# Patient Record
Sex: FEMALE | Race: OTHER | NOT HISPANIC OR LATINO
[De-identification: names, ages, dates, MRNs, and addresses within clinical notes are randomized per-mention and may not be internally consistent; named-entity substitution may affect disease eponyms.]

---

## 2021-10-18 PROBLEM — Z00.00 ENCOUNTER FOR PREVENTIVE HEALTH EXAMINATION: Status: ACTIVE | Noted: 2021-10-18

## 2021-11-01 ENCOUNTER — RESULT REVIEW (OUTPATIENT)
Age: 71
End: 2021-11-01

## 2021-11-01 ENCOUNTER — OUTPATIENT (OUTPATIENT)
Dept: OUTPATIENT SERVICES | Facility: HOSPITAL | Age: 71
LOS: 1 days | End: 2021-11-01
Payer: MEDICARE

## 2021-11-01 ENCOUNTER — APPOINTMENT (OUTPATIENT)
Dept: ORTHOPEDIC SURGERY | Facility: CLINIC | Age: 71
End: 2021-11-01
Payer: MEDICARE

## 2021-11-01 DIAGNOSIS — M17.11 UNILATERAL PRIMARY OSTEOARTHRITIS, RIGHT KNEE: ICD-10-CM

## 2021-11-01 DIAGNOSIS — Z87.39 PERSONAL HISTORY OF OTHER DISEASES OF THE MUSCULOSKELETAL SYSTEM AND CONNECTIVE TISSUE: ICD-10-CM

## 2021-11-01 DIAGNOSIS — Z96.641 PRESENCE OF RIGHT ARTIFICIAL HIP JOINT: ICD-10-CM

## 2021-11-01 DIAGNOSIS — Z96.642 PRESENCE OF LEFT ARTIFICIAL HIP JOINT: ICD-10-CM

## 2021-11-01 DIAGNOSIS — Z78.9 OTHER SPECIFIED HEALTH STATUS: ICD-10-CM

## 2021-11-01 DIAGNOSIS — M54.16 RADICULOPATHY, LUMBAR REGION: ICD-10-CM

## 2021-11-01 DIAGNOSIS — Z96.652 PRESENCE OF LEFT ARTIFICIAL KNEE JOINT: ICD-10-CM

## 2021-11-01 DIAGNOSIS — Z60.2 PROBLEMS RELATED TO LIVING ALONE: ICD-10-CM

## 2021-11-01 DIAGNOSIS — M62.838 OTHER MUSCLE SPASM: ICD-10-CM

## 2021-11-01 PROCEDURE — 72170 X-RAY EXAM OF PELVIS: CPT

## 2021-11-01 PROCEDURE — 72020 X-RAY EXAM OF SPINE 1 VIEW: CPT | Mod: 26

## 2021-11-01 PROCEDURE — 73564 X-RAY EXAM KNEE 4 OR MORE: CPT | Mod: 26,LT,RT

## 2021-11-01 PROCEDURE — 72020 X-RAY EXAM OF SPINE 1 VIEW: CPT

## 2021-11-01 PROCEDURE — 99204 OFFICE O/P NEW MOD 45 MIN: CPT

## 2021-11-01 PROCEDURE — 73564 X-RAY EXAM KNEE 4 OR MORE: CPT

## 2021-11-01 PROCEDURE — 72170 X-RAY EXAM OF PELVIS: CPT | Mod: 26

## 2021-11-01 RX ORDER — METHYLPREDNISOLONE 4 MG/1
4 TABLET ORAL
Qty: 1 | Refills: 0 | Status: ACTIVE | COMMUNITY
Start: 2021-11-01 | End: 1900-01-01

## 2021-11-01 SDOH — SOCIAL STABILITY - SOCIAL INSECURITY: PROBLEMS RELATED TO LIVING ALONE: Z60.2

## 2021-11-01 NOTE — HISTORY OF PRESENT ILLNESS
[de-identified] : 70 y/o female presents for evaluation of bilateral leg pain s/p left TKR, and bilateral hip replacements. Patient reports that she has been having leg spasms intermittently while turning in bed at night over the past year and a half with a frequency of about once per month. She repots that when she goes to roll over in bed she occasionally will get a cramp in her hamstring and calf and she is unable to straighten her leg.  She denies any symptoms in between episodes. Her joint replacements were uncomplicated and she is very active and able to walk several blocks without hip or knee pain.  She denies any radicular symptoms. She has no history of blood clots and is not on any anticoagulants other than baby aspirin for cardiac prophylaxis.

## 2021-11-01 NOTE — DISCUSSION/SUMMARY
[de-identified] : 72 y/o active female s/p uncomplicated left total knee and bilateral total hips with grade 2 lumbar spondylolisthesis. I believe this is causing her intermittent leg pain and spasm while turning over. She has no radicular symptoms and her neurovascular exam is otherwise unremarkable. She has done excellent after her joint replacements and denies hip or knee pain. Xrays today show no evidence of periprosthetic complication. She does have tricompartmental OA in her right knee, but she is asymptomatic.\par \par Plan: \par -Medrol dose pack. Stop diclofenac while on Medrol. \par - PT for osteopathic eval and treatment of lumbar spine\par - F/U with Dr. Heard for spine evaluation

## 2021-11-01 NOTE — PHYSICAL EXAM
[de-identified] : Patient is well nourished, well-developed, in no acute distress, with appropriate mood and affect. The patient is oriented to time, place, and person. Gait evaluation does not reveal a limp. The skin examination does not reveal obvious lesions, lacerations, or ecchymosis.\par \par Left Knee: No effusion. Well healed anterior surgical scar. AROM 0-120. No joint tenderness. Quads 4/5. Negative Homans. No  calf tenderness. \par \par Right Knee: AROM 0-125. No effusion.  No joint tenderness. Negative McMurrays and Steinmann. Negative Homans. No  calf tenderness. \par \par Negative SLR. \par Positive SI joint tenderness with step off \par \par Motor Strength:\par Lower Extremity\par Bilateral  IP/Q/H/TA/GS/EHL 5/5\par Sensation:  \par LE: SILT bilateral from L2-S1\par Pulses:\par LE:  DP/PT 2+ b/l, CR <2 sec bilaterally\par LE Compartments: Bilateral soft, non-tender, no calf swelling appreciated\par \par  [de-identified] : Standard 4-view radiographs of the bilateral knees obtained today were reviewed today and show status post left total knee replacement. Components in good alignment without evidence of subsidence or osseous complication. Right knee shows mild-moderate tricompartmental arthritis worst in the lateral and PF compartments. \par \par AP pelvis radiographs were obtained today and show s/p bilateral hip replacements with components in anatomic alignment without fracture or dislocation. \par \par Lateral lumbar spine radiographs were obtained today and show grade 2 spondylolisthesis of L5 vertebra\par \par \par

## 2021-12-08 ENCOUNTER — RESULT REVIEW (OUTPATIENT)
Age: 71
End: 2021-12-08

## 2021-12-08 ENCOUNTER — OUTPATIENT (OUTPATIENT)
Dept: OUTPATIENT SERVICES | Facility: HOSPITAL | Age: 71
LOS: 1 days | End: 2021-12-08
Payer: MEDICARE

## 2021-12-08 ENCOUNTER — APPOINTMENT (OUTPATIENT)
Dept: ORTHOPEDIC SURGERY | Facility: CLINIC | Age: 71
End: 2021-12-08
Payer: MEDICARE

## 2021-12-08 VITALS
SYSTOLIC BLOOD PRESSURE: 120 MMHG | OXYGEN SATURATION: 99 % | HEIGHT: 62 IN | DIASTOLIC BLOOD PRESSURE: 80 MMHG | HEART RATE: 76 BPM | WEIGHT: 185 LBS | BODY MASS INDEX: 34.04 KG/M2

## 2021-12-08 PROCEDURE — 72082 X-RAY EXAM ENTIRE SPI 2/3 VW: CPT

## 2021-12-08 PROCEDURE — 99214 OFFICE O/P EST MOD 30 MIN: CPT

## 2021-12-08 PROCEDURE — 72084 X-RAY EXAM ENTIRE SPI 6/> VW: CPT | Mod: 26

## 2021-12-08 PROCEDURE — 72100 X-RAY EXAM L-S SPINE 2/3 VWS: CPT

## 2021-12-08 RX ORDER — ETODOLAC 400 MG/1
400 TABLET, FILM COATED ORAL
Qty: 60 | Refills: 0 | Status: ACTIVE | COMMUNITY
Start: 2021-12-08 | End: 1900-01-01

## 2021-12-09 NOTE — DISCUSSION/SUMMARY
[de-identified] : Discussed my findings with the patient. Ms. Streeter reports her primary symptom at this time is her low back pain. I am recommending she stop physical therapy at this time as it appears to be aggravating her low back pain. Also given instructions and prescription for a course of etodolac. \par The patient will follow up in 3-4 weeks if still having pain, sooner if there is an issue. Would order MRI lumbar without contrast before follow up appointment at that time. All questions answered.

## 2021-12-09 NOTE — HISTORY OF PRESENT ILLNESS
[de-identified] : Referred by Dr. Chino\par \par Ms. CONNELL is a very pleasant 71 year old female who complains of lower extremity pain for the past several years, atraumatic. Pain localized to bilateral medial thighs, does not extend below the knees. She reports only gets pain at night, wakes from sleep. No daytime pain, able to walk several miles without limitation. Denies lower extremity numbness/weakness/paresthesias. Started physical therapy per Dr. Chino last month. Reports low back pain onset after massage during therapy, describes as a deep ache. Denies low back pain prior to starting PT. Lower extremity pain improving, low back pain now primary complaint. Improves with intermittent NSAIDs.\par \par The patient no history of previous spine surgery.\par \par The patient has no history of unexpected weight loss, no history of active cancer, no history bladder or bowel dysfunction, no night pain, no fevers or chills.\par \par The past medical history, surgical history, family history, allergies, medications, 10+ point review of systems, family history and social history were reviewed and non contributory.\par \par

## 2022-01-05 ENCOUNTER — APPOINTMENT (OUTPATIENT)
Dept: ORTHOPEDIC SURGERY | Facility: CLINIC | Age: 72
End: 2022-01-05
Payer: MEDICARE

## 2022-01-05 DIAGNOSIS — M47.816 SPONDYLOSIS W/OUT MYELOPATHY OR RADICULOPATHY, LUMBAR REGION: ICD-10-CM

## 2022-01-05 PROCEDURE — 99214 OFFICE O/P EST MOD 30 MIN: CPT

## 2022-01-05 NOTE — HISTORY OF PRESENT ILLNESS
[de-identified] : Follow up 1/5/22: Patient presents for follow up. Reports that bilateral medial thigh pain now almost completely resolved. Primary symptom is no lumbosacral pain. Denies new neurologic symptoms.\par \par Initial 12/8/21: Ms. CONNELL is a very pleasant 71 year old female who complains of lower extremity pain for the past several years, atraumatic. Pain localized to bilateral medial thighs, does not extend below the knees. She reports only gets pain at night, wakes from sleep. No daytime pain, able to walk several miles without limitation. Denies lower extremity numbness/weakness/paresthesias. Started physical therapy per Dr. Chino last month. Reports low back pain onset after massage during therapy, describes as a deep ache. Denies low back pain prior to starting PT. Lower extremity pain improving, low back pain now primary complaint. Improves with intermittent NSAIDs.\par \par The patient no history of previous spine surgery.\par \par The patient has no history of unexpected weight loss, no history of active cancer, no history bladder or bowel dysfunction, no night pain, no fevers or chills.\par \par The past medical history, surgical history, family history, allergies, medications, 10+ point review of systems, family history and social history were reviewed and non contributory.\par \par

## 2022-01-05 NOTE — DISCUSSION/SUMMARY
[de-identified] : Discussed my findings with the patient. I am recommending an MRI lumbar without contrast, order given. Patient had stopped PT after last visit per my instruction, ok to resume. Order for PT given. She will follow up with me in approximately 6 weeks after imaging has been completed, sooner if there is an issue. All questions answered.

## 2022-01-05 NOTE — PHYSICAL EXAM
[de-identified] : General: patient is well developed, well nourished, in no acute \par distress, alert and oriented x 3. \par \par Mood and affect: normal\par \par Respiratory: no respiratory distress noted\par \par Skin: no scars over spine, skin intact, no erythema, increased warmth\par \par Alignment:The spine is well compensated in the coronal and sagittal plane.  \par \par Gait: The patient is able to toe walk and heel walk without difficulty. \par \par Palpation: no tenderness to palpation spine or paraspinal region\par \par Range of motion: Lumbar spine ROM is restricted\par \par Neurologic Exam:\par Motor: Manual Muscle testing in the lower extremities is 5 out of 5 in all muscle groups. There is no evidence of muscular atrophy in the lower extremities. Sensory: Sensation to light touch is grossly intact in the lower extremities\par \par Reflexes: DTR are present and symmetric throughout, negative dukes bilaterally, negative inverted radial reflex bilaterally, no clonus, plantar responses are flexor\par \par Hip Exam: No pain with internal or external rotation of hips bilaterally\par \par Special tests: Straight leg raise test negative.  Cross straight leg test negative.  RAFIA test negative\par \par Vascular: Examination of the peripheral vascular system demonstrates no evidence of congestion or edema. no evidence of lymphedema bilateral lower extremities, pulses are present and symmetric in both lower extremities.\par \par  [de-identified] : XR 12/8/21: multilevel degenerative changes, grade 1 anterolisthesis L4 on L5, no dynamic instability, mild lumbar levocurvature, no fractures seen

## 2022-02-09 ENCOUNTER — APPOINTMENT (OUTPATIENT)
Dept: ORTHOPEDIC SURGERY | Facility: CLINIC | Age: 72
End: 2022-02-09
Payer: MEDICARE

## 2022-02-09 PROCEDURE — 99214 OFFICE O/P EST MOD 30 MIN: CPT

## 2022-02-11 NOTE — ADDENDUM
[FreeTextEntry1] : Documented by Mariah Murry acting as scribe for Dr. Heard on 02/09/2022 \par \par All Medical record entries made by the Scribe were at my, Dr. Heard's, direction and personally dictated by me on 02/09/2022. I have reviewed the chart and agree that the record accurately reflects my personal performance of the history, physical exam, assessment and plan. I have also personally directed, reviewed, and agreed with the discharge instructions.

## 2022-02-11 NOTE — DISCUSSION/SUMMARY
[de-identified] : Diagnosis: L4-5 grade 1 spondylolisthesis with severe stenosis and L4-S1 facet arthropathy\par \par Najma is actually doing quite well with her symptoms overall despite her imaging findings. Outside of the first 1.5 hours of the day where she has significant buttock discomfort she is quite functional and does not have pain throughout the rest of the day. She does not have any symptoms of lumbar radiculopathy or neurogenic claudication throughout the day. Her exam is normal. I've advised her to keep an eye on this problem at this point and watch out for more concerning nerve symptoms or increasing pain in her back or buttocks region. If that occurs she will come for FU. We discussed that in her lifetime it is likely she will need an operation to address this although at this point she is relatively asymptomatic so I would not address it at this point. All questions answered.

## 2022-02-11 NOTE — HISTORY OF PRESENT ILLNESS
[de-identified] : Follow up 2/9/22: Patient presents for a follow up. Patient continues to have low back pain radiating to both buttocks every morning. Denies any pain radiating further down lower extremities. She only gets pain in the morning which resolves after 1.5 hours. Able to walk without restrictions. Able to do daily activities without restrictions. Patient has been taking Diclofenac. Denies new neurologic symptoms. Here to review imaging. 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 She also had knee replacement in the past.\par \par Follow up 1/5/22: Patient presents for follow up. Reports that bilateral medial thigh pain now almost completely resolved. Primary symptom is no lumbosacral pain. Denies new neurologic symptoms.\par \par Initial 12/8/21: Ms. CONNELL is a very pleasant 71 year old female who complains of lower extremity pain for the past several years, atraumatic. Pain localized to bilateral medial thighs, does not extend below the knees. She reports only gets pain at night, wakes from sleep. No daytime pain, able to walk several miles without limitation. Denies lower extremity numbness/weakness/paresthesias. Started physical therapy per Dr. Chino last month. Reports low back pain onset after massage during therapy, describes as a deep ache. Denies low back pain prior to starting PT. Lower extremity pain improving, low back pain now primary complaint. Improves with intermittent NSAIDs.\par \par The patient no history of previous spine surgery.\par \par The patient has no history of unexpected weight loss, no history of active cancer, no history bladder or bowel dysfunction, no night pain, no fevers or chills.\par \par The past medical history, surgical history, family history, allergies, medications, 10+ point review of systems, family history and social history were reviewed and non contributory.\par \par

## 2022-02-11 NOTE — PHYSICAL EXAM
[de-identified] : Physical Exam:\par \par General: patient is well developed, well nourished, in no acute distress, alert and oriented x3.\par \par Mood and affect: normal\par \par Respiratory: no respiratory distress noted\par \par Skin: no scars over spine, skin intact, no erythema, increased warmth\par \par Alignment: The spine is well compensated in the coronal and sagittal plane\par \par Gait: The patient is able to toe walk and heel walk without difficulty. The patient is able to tandem gait without difficulty.\par \par Palpation: no tenderness to palpation spine or paraspinal region\par \par Range of motion: lumbar spine ROM is full\par \par Neurological Exam:\par Motor: Manual muscle testing in the upper and lower extremities is 5 out of 5 in all muscle groups. There is no evidence of muscular atrophy in the upper extremities\par Sensory: Sensation to light touch is grossly intact in the upper and lower extremities\par \par Reflexes: DTR are present and symmetric throughout, no clonus, plantar responses are flexor\par \par Hip exam: No pain with internal or external rotation of hips bilaterally\par \par Special tests: Straight leg raise test negative. Cross straight leg test negative. RAFIA test negative.\par \par Vascular: Examination of the peripheral vascular system demonstrates no evidence of congestion or edema. no lymphedema bilateral lower extremities, pulses are present and symmetric in both lower extremities.\par  [de-identified] : 1/18/22 MRI lumbar spine: grade 1 spondylolisthesis at L4-5 with disc uncovering, severe central lateral recess stenosis at this level, severe facet arthropathy, severe facet atrophy at L5-S1 though there is no stenosis at that level, L4-5 moderate to severe nerve foraminal stenosis bilaterally\par \par XR 12/8/21: multilevel degenerative changes, grade 1 anterolisthesis L4 on L5, no dynamic instability, mild lumbar levocurvature, no fractures seen

## 2022-03-03 ENCOUNTER — APPOINTMENT (OUTPATIENT)
Dept: ORTHOPEDIC SURGERY | Facility: CLINIC | Age: 72
End: 2022-03-03
Payer: MEDICARE

## 2022-03-03 VITALS — BODY MASS INDEX: 34.41 KG/M2 | WEIGHT: 187 LBS | RESPIRATION RATE: 16 BRPM | HEIGHT: 62 IN

## 2022-03-03 PROCEDURE — 99213 OFFICE O/P EST LOW 20 MIN: CPT

## 2022-03-03 PROCEDURE — 73110 X-RAY EXAM OF WRIST: CPT | Mod: LT,RT

## 2022-06-30 ENCOUNTER — FORM ENCOUNTER (OUTPATIENT)
Age: 72
End: 2022-06-30

## 2022-07-01 ENCOUNTER — NON-APPOINTMENT (OUTPATIENT)
Age: 72
End: 2022-07-01

## 2022-07-01 ENCOUNTER — APPOINTMENT (OUTPATIENT)
Dept: NEUROLOGY | Facility: CLINIC | Age: 72
End: 2022-07-01

## 2022-07-01 VITALS
OXYGEN SATURATION: 96 % | HEIGHT: 62 IN | DIASTOLIC BLOOD PRESSURE: 62 MMHG | BODY MASS INDEX: 27.05 KG/M2 | WEIGHT: 147 LBS | TEMPERATURE: 97.9 F | HEART RATE: 89 BPM | SYSTOLIC BLOOD PRESSURE: 135 MMHG

## 2022-07-01 DIAGNOSIS — G56.03 CARPAL TUNNEL SYNDROM,BILATERAL UPPER LIMBS: ICD-10-CM

## 2022-07-01 PROCEDURE — 95885 MUSC TST DONE W/NERV TST LIM: CPT

## 2022-07-01 PROCEDURE — 95911 NRV CNDJ TEST 9-10 STUDIES: CPT

## 2022-07-01 PROCEDURE — 99204 OFFICE O/P NEW MOD 45 MIN: CPT | Mod: 25

## 2022-07-01 NOTE — CONSULT LETTER
[Dear  ___] : Dear  [unfilled], [Consult Letter:] : I had the pleasure of evaluating your patient, [unfilled]. [Please see my note below.] : Please see my note below. [Consult Closing:] : Thank you very much for allowing me to participate in the care of this patient.  If you have any questions, please do not hesitate to contact me. [Sincerely,] : Sincerely, [FreeTextEntry3] : Andrey Baires M.D.\par Neurology, Electromyography and Neuromuscular Medicine\par [facility]\par \par  of Neurology\par \[facility] [place] [facility]\"" / [facility] [employer]

## 2022-07-01 NOTE — PHYSICAL EXAM
[FreeTextEntry1] : Motor: UE strength 5/5 symmetric\par Sensory: tinel's sign positive at right wrist, absent on left \par Reflexes: 2+ symmetric UE b/l\par Gait: normal

## 2022-07-01 NOTE — PROCEDURE
[FreeTextEntry1] : \par Nerve Conduction and Electromyography Report\par  [FreeTextEntry3] : \par Electro Physiologic Findings:\par \par Limb temperature was monitored and maintained at approximately 32 – 36° C in the upper extremities.\par \par The right median sensory response was low amplitude and there was complete conduction block across the wrist; the mixed nerve response was very slow across the wrist. The right median distal motor latency was prolonged without conduction block, the motor amplitude was borderline, and there was a Juancarlos-Coni anastomosis. The left median sensory and mixed nerve responses were slow across the wrist; the distal motor latency was borderline without conduction block and the motor amplitude was normal. The lumbrical studies were positive bilaterally, worse on the right. The right radial sensory response was normal. \par \par Needle electromyography was performed on the right abductor pollicis brevis muscle which demonstrated mild chronic denervation without active denervation. \par \par Clinical Electrophysiological Impression: \par \par This electrodiagnostic study demonstrated bilateral median nerve entrapments at the wrists, moderate on the right and mild on the left. There was no evidence of upper extremity polyneuropathy.

## 2022-07-01 NOTE — HISTORY OF PRESENT ILLNESS
[FreeTextEntry1] : Referred by Dr. Rod for hand numbness - started about 3-4 months ago, worse at night, both hands about equal\par She's been wearing wrist braces which help\par She also has severe low back and buttock pain, worst in the morning, no radiation in to the legs \par \par Reviewed:\par notes from ortho hand, ortho spine\par MRI L spine - multilevel spondylosis worst at L4/5 with severe central canal stenosis

## 2022-07-01 NOTE — CONSULT LETTER
[Dear  ___] : Dear  [unfilled], [Consult Letter:] : I had the pleasure of evaluating your patient, [unfilled]. [Please see my note below.] : Please see my note below. [Consult Closing:] : Thank you very much for allowing me to participate in the care of this patient.  If you have any questions, please do not hesitate to contact me. [Sincerely,] : Sincerely, [FreeTextEntry3] : Andrey Baires M.D.\par Neurology, Electromyography and Neuromuscular Medicine\par [facility]\par \par  of Neurology\par [facility] / [facility] [employer]

## 2022-07-01 NOTE — ASSESSMENT
[FreeTextEntry1] : Symptoms and exam consistent with bilateral carpal tunnel syndrome\par NCS/EMG demonstrated bilateral median nerve entrapments at the wrists - moderate on the right and mild on the left \par Symptoms are well controlled with nocturnal wrist splinting, if symptoms worsen then f/u with Dr. Rod for surgical decompression \par \par Back pain / spinal stenosis - she will f/u with Dr. Heard if symptoms worsen \par \par See separate procedure note for full results of NCS/EMG study

## 2022-08-10 ENCOUNTER — APPOINTMENT (OUTPATIENT)
Dept: ORTHOPEDIC SURGERY | Facility: CLINIC | Age: 72
End: 2022-08-10

## 2022-08-10 DIAGNOSIS — G89.29 LOW BACK PAIN, UNSPECIFIED: ICD-10-CM

## 2022-08-10 DIAGNOSIS — M54.50 LOW BACK PAIN, UNSPECIFIED: ICD-10-CM

## 2022-08-10 PROCEDURE — 99213 OFFICE O/P EST LOW 20 MIN: CPT

## 2022-08-15 PROBLEM — M54.50 LUMBOSACRAL PAIN, CHRONIC: Status: ACTIVE | Noted: 2022-01-05

## 2022-10-24 NOTE — DISCUSSION/SUMMARY
[de-identified] : Diagnosis: L4-5 grade 1 spondylolisthesis with severe stenosis and L4-S1 facet arthropathy\par \par Najma is actually doing quite well with her symptoms overall despite her imaging findings. Given her relatively mild symptoms and normal neurologic exam, I've advised her to keep an eye on this problem at this point and watch out for more concerning nerve symptoms or increasing pain in her back or buttocks region. If that occurs she will come for FU. We discussed that in her lifetime it is likely she will need an operation to address this although at this point she is relatively asymptomatic so I would not address it at this point. She will follow up with me in approximately 6 months, sooner if there is an issue. XR lumbar 4 view at that time. All questions answered. \par

## 2022-10-24 NOTE — PHYSICAL EXAM
[de-identified] : Physical Exam:\par \par General: patient is well developed, well nourished, in no acute distress, alert and oriented x3.\par \par Mood and affect: normal\par \par Respiratory: no respiratory distress noted\par \par Skin: no scars over spine, skin intact, no erythema, increased warmth\par \par Alignment: The spine is well compensated in the coronal and sagittal plane\par \par Gait: The patient is able to toe walk and heel walk without difficulty. The patient is able to tandem gait without difficulty.\par \par Palpation: no tenderness to palpation spine or paraspinal region\par \par Range of motion: lumbar spine ROM is full\par \par Neurological Exam:\par Motor: Manual muscle testing in the upper and lower extremities is 5 out of 5 in all muscle groups. There is no evidence of muscular atrophy in the upper extremities\par Sensory: Sensation to light touch is grossly intact in the upper and lower extremities\par \par Reflexes: DTR are present and symmetric throughout, no clonus, plantar responses are flexor\par \par Hip exam: No pain with internal or external rotation of hips bilaterally\par \par Special tests: Straight leg raise test negative. Cross straight leg test negative. RAFIA test negative.\par \par Vascular: Examination of the peripheral vascular system demonstrates no evidence of congestion or edema. no lymphedema bilateral lower extremities, pulses are present and symmetric in both lower extremities.\par \par  [de-identified] : XR 8/10/22 lumbar AP/lateral/flexion/extension (my read): L4/L5 grade 1 anterolisthesis, mild instability with flexion/extension; multilevel degenerative changes at the other levels; history of bilateral DARLENE, no fractures\par \par 1/18/22 MRI lumbar spine: grade 1 spondylolisthesis at L4-5 with disc uncovering, severe central lateral recess stenosis at this level, severe facet arthropathy, severe facet atrophy at L5-S1 though there is no stenosis at that level, L4-5 moderate to severe nerve foraminal stenosis bilaterally\par \par XR 12/8/21: multilevel degenerative changes, grade 1 anterolisthesis L4 on L5, no dynamic instability, mild lumbar levocurvature, no fractures seen. \par \par

## 2022-10-24 NOTE — HISTORY OF PRESENT ILLNESS
[de-identified] : Follow up 8/10/22: Patient presents for follow up. She continues to have low back pain that radiates to bilateral buttocks. Denies new neurologic symptoms. Patient able to perform daily activities without restriction or need for medication. \par \par Follow up 2/9/22: Patient presents for a follow up. Patient continues to have low back pain radiating to both buttocks every morning. Denies any pain radiating further down lower extremities. She only gets pain in the morning which resolves after 1.5 hours. Able to walk without restrictions. Able to do daily activities without restrictions. Patient has been taking Diclofenac. Denies new neurologic symptoms. Here to review imaging. 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 She also had knee replacement in the past.\par \par Follow up 1/5/22: Patient presents for follow up. Reports that bilateral medial thigh pain now almost completely resolved. Primary symptom is no lumbosacral pain. Denies new neurologic symptoms.\par \par Initial 12/8/21: Ms. CONNELL is a very pleasant 71 year old female who complains of lower extremity pain for the past several years, atraumatic. Pain localized to bilateral medial thighs, does not extend below the knees. She reports only gets pain at night, wakes from sleep. No daytime pain, able to walk several miles without limitation. Denies lower extremity numbness/weakness/paresthesias. Started physical therapy per Dr. Chino last month. Reports low back pain onset after massage during therapy, describes as a deep ache. Denies low back pain prior to starting PT. Lower extremity pain improving, low back pain now primary complaint. Improves with intermittent NSAIDs.\par \par The patient no history of previous spine surgery.\par \par The patient has no history of unexpected weight loss, no history of active cancer, no history bladder or bowel dysfunction, no night pain, no fevers or chills.\par \par The past medical history, surgical history, family history, allergies, medications, 10+ point review of systems, family history and social history were reviewed and non contributory.

## 2023-02-06 ENCOUNTER — APPOINTMENT (OUTPATIENT)
Dept: ORTHOPEDIC SURGERY | Facility: CLINIC | Age: 73
End: 2023-02-06
Payer: MEDICARE

## 2023-02-06 PROCEDURE — 72110 X-RAY EXAM L-2 SPINE 4/>VWS: CPT

## 2023-02-06 PROCEDURE — 99214 OFFICE O/P EST MOD 30 MIN: CPT

## 2023-02-17 NOTE — PHYSICAL EXAM
[de-identified] : Physical Exam:\par \par General: patient is well developed, well nourished, in no acute distress, alert and oriented x3.\par \par Mood and affect: normal\par \par Respiratory: no respiratory distress noted\par \par Skin: no scars over spine, skin intact, no erythema, increased warmth\par \par Alignment: The spine is well compensated in the coronal and sagittal plane\par \par Gait: The patient is able to toe walk and heel walk without difficulty. The patient is able to tandem gait without difficulty.\par \par Palpation: no tenderness to palpation spine or paraspinal region\par \par Range of motion: lumbar spine ROM is full\par \par Neurological Exam:\par Motor: Manual muscle testing in the upper and lower extremities is 5 out of 5 in all muscle groups. There is no evidence of muscular atrophy in the upper extremities\par Sensory: Sensation to light touch is grossly intact in the upper and lower extremities\par \par Reflexes: DTR are present and symmetric throughout, no clonus, plantar responses are flexor\par \par Hip exam: No pain with internal or external rotation of hips bilaterally\par \par Special tests: Straight leg raise test negative. Cross straight leg test negative. RAFIA test negative.\par \par Vascular: Examination of the peripheral vascular system demonstrates no evidence of congestion or edema. no lymphedema bilateral lower extremities, pulses are present and symmetric in both lower extremities.\par \par  [de-identified] : XR Lumbar Spine 4 View 02/06/2023 [my read]: Grade 2 spondylolisthesis of L4/5 with some instability on flexion/extension. Disc collapse at L4/5 as well as significant disc height loss at L5/S1. No fracture seen.\par \par XR 8/10/22 lumbar AP/lateral/flexion/extension (my read): L4/L5 grade 1 anterolisthesis, mild instability with flexion/extension; multilevel degenerative changes at the other levels; history of bilateral DARLENE, no fractures\par \par 1/18/22 MRI lumbar spine: grade 1 spondylolisthesis at L4-5 with disc uncovering, severe central lateral recess stenosis at this level, severe facet arthropathy, severe facet atrophy at L5-S1 though there is no stenosis at that level, L4-5 moderate to severe nerve foraminal stenosis bilaterally\par \par XR 12/8/21: multilevel degenerative changes, grade 1 anterolisthesis L4 on L5, no dynamic instability, mild lumbar levocurvature, no fractures seen. \par \par

## 2023-02-17 NOTE — HISTORY OF PRESENT ILLNESS
[de-identified] : Follow up 02/06/2023: Patient here for follow up. She reports that she still has significant lumbosacral pain that radiates to her buttocks. She reports that when she stands up sometimes she will have a numbing type feeling in her leg that will last for 5 to 10 minutes and will resolve. She is currently not limited in terms of ambulation distance or frequency. The pain in her back is mostly when she is sitting for prolonged periods of time. She denies any weakness in her legs. She ambulated today without any assistive device.\par \par  Follow up 8/10/22: Patient presents for follow up. She continues to have low back pain that radiates to bilateral buttocks. Denies new neurologic symptoms. Patient able to perform daily activities without restriction or need for medication. \par \par Follow up 2/9/22: Patient presents for a follow up. Patient continues to have low back pain radiating to both buttocks every morning. Denies any pain radiating further down lower extremities. She only gets pain in the morning which resolves after 1.5 hours. Able to walk without restrictions. Able to do daily activities without restrictions. Patient has been taking Diclofenac. Denies new neurologic symptoms. Here to review imaging. 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 She also had knee replacement in the past.\par \par Follow up 1/5/22: Patient presents for follow up. Reports that bilateral medial thigh pain now almost completely resolved. Primary symptom is no lumbosacral pain. Denies new neurologic symptoms.\par \par Initial 12/8/21: Ms. CONNELL is a very pleasant 71 year old female who complains of lower extremity pain for the past several years, atraumatic. Pain localized to bilateral medial thighs, does not extend below the knees. She reports only gets pain at night, wakes from sleep. No daytime pain, able to walk several miles without limitation. Denies lower extremity numbness/weakness/paresthesias. Started physical therapy per Dr. Chino last month. Reports low back pain onset after massage during therapy, describes as a deep ache. Denies low back pain prior to starting PT. Lower extremity pain improving, low back pain now primary complaint. Improves with intermittent NSAIDs.\par \par The patient no history of previous spine surgery.\par \par The patient has no history of unexpected weight loss, no history of active cancer, no history bladder or bowel dysfunction, no night pain, no fevers or chills.\par \par The past medical history, surgical history, family history, allergies, medications, 10+ point review of systems, family history and social history were reviewed and non contributory.

## 2023-02-17 NOTE — END OF VISIT
[FreeTextEntry3] : All medical record entries made by the Scribe were at my, Dr. Duglas Heard, direction and personally dictated by me on 02/06/2023. I have reviewed the chart and agree that the record accurately reflects my personal performance of the history, physical exam, assessment and plan. I have also personally directed, reviewed, and agreed with the chart.

## 2023-02-17 NOTE — DISCUSSION/SUMMARY
[de-identified] : Diagnosis: L4/5 grade 2 spondylolisthesis, L4/5 spinal stenosis, L4-S1 facet arthropathy.\par \par I discussed my findings with the patient. The patient currently is managing well with her symptoms. We will keep an eye on this intermittent numbing feeling that she has in her lower extremities. She should follow up in 6 months with a new XR 4 View Lumbar Spine at that time. She should come back sooner if anything changes that she is concerned about.

## 2023-08-07 ENCOUNTER — APPOINTMENT (OUTPATIENT)
Dept: ORTHOPEDIC SURGERY | Facility: CLINIC | Age: 73
End: 2023-08-07
Payer: MEDICARE

## 2023-08-07 DIAGNOSIS — M43.16 SPONDYLOLISTHESIS, LUMBAR REGION: ICD-10-CM

## 2023-08-07 DIAGNOSIS — M48.061 SPINAL STENOSIS, LUMBAR REGION WITHOUT NEUROGENIC CLAUDICATION: ICD-10-CM

## 2023-08-07 PROCEDURE — 72110 X-RAY EXAM L-2 SPINE 4/>VWS: CPT

## 2023-08-07 PROCEDURE — 99214 OFFICE O/P EST MOD 30 MIN: CPT | Mod: 25

## 2023-08-07 NOTE — PHYSICAL EXAM
[de-identified] : Gen: NAD, sitting comfortably, AOx3 MSK: Full spinal ROM NT over spinous processes Ambulating without difficulty Motor strength 5/5 in L2-S1 bilaterally SILT L2-S1 bilaterally Negative clonus Downgoing toes on Babinski Reflexes within normal limits 2+ DP/PT pulses bilaterally  [de-identified] : XR Spine 8/07 reviewed and discussed with patient. XR reveals a grade I sponydolisthesis that has not progressed since prior imaging 6 months ago. No gross instability present on flexion and extension views.

## 2023-08-07 NOTE — HISTORY OF PRESENT ILLNESS
[de-identified] : Patient is a 73 F with spondylolisthesis here for follow up. She endorses occasional intermittent numbness in her bilateral lower extremities after sitting for a prolonged period of time  and occasional pain when twisting in an awkward position. Otherwise she is doing well. She is able to tolerate these symptoms. She is able to ambulate well and for long periods of time. She denies any other weakness, numbness, or tingling other than that mentioned.

## 2023-08-07 NOTE — DISCUSSION/SUMMARY
[de-identified] : 73 F with Grade 1 sponydolisthesis and occassional numbness and tingling, severe stenosis - Patient is able to tolerate symptoms and managing well. We will continue to monitor her symptoms. - No radiographic progression present on new imaging - F/u in 6 months with new XR of the Lumbar spine, unless new symptoms or worsening of symptoms develop then she should come back earlier

## 2024-02-05 ENCOUNTER — APPOINTMENT (OUTPATIENT)
Dept: ORTHOPEDIC SURGERY | Facility: CLINIC | Age: 74
End: 2024-02-05

## 2025-03-06 ENCOUNTER — OUTPATIENT (OUTPATIENT)
Dept: OUTPATIENT SERVICES | Facility: HOSPITAL | Age: 75
LOS: 1 days | End: 2025-03-06
Payer: MEDICARE

## 2025-03-06 ENCOUNTER — APPOINTMENT (OUTPATIENT)
Dept: SPINE | Facility: CLINIC | Age: 75
End: 2025-03-06
Payer: MEDICARE

## 2025-03-06 DIAGNOSIS — G95.9 DISEASE OF SPINAL CORD, UNSPECIFIED: ICD-10-CM

## 2025-03-06 PROCEDURE — 71046 X-RAY EXAM CHEST 2 VIEWS: CPT

## 2025-03-06 PROCEDURE — 99203 OFFICE O/P NEW LOW 30 MIN: CPT

## 2025-03-06 PROCEDURE — 71046 X-RAY EXAM CHEST 2 VIEWS: CPT | Mod: 26

## 2025-03-27 ENCOUNTER — APPOINTMENT (OUTPATIENT)
Dept: CT IMAGING | Facility: CLINIC | Age: 75
End: 2025-03-27
Payer: MEDICARE

## 2025-03-27 ENCOUNTER — OUTPATIENT (OUTPATIENT)
Dept: OUTPATIENT SERVICES | Facility: HOSPITAL | Age: 75
LOS: 1 days | End: 2025-03-27

## 2025-03-27 PROCEDURE — 72125 CT NECK SPINE W/O DYE: CPT | Mod: 26

## 2025-04-09 VITALS
SYSTOLIC BLOOD PRESSURE: 136 MMHG | DIASTOLIC BLOOD PRESSURE: 74 MMHG | WEIGHT: 167.55 LBS | HEART RATE: 88 BPM | TEMPERATURE: 97 F | OXYGEN SATURATION: 96 % | HEIGHT: 62 IN | RESPIRATION RATE: 16 BRPM

## 2025-04-09 LAB
APTT BLD: 31.7 SEC
INR PPP: 0.94
PT BLD: 11 SEC

## 2025-04-09 RX ORDER — ACETAMINOPHEN 500 MG/5ML
1000 LIQUID (ML) ORAL ONCE
Refills: 0 | Status: COMPLETED | OUTPATIENT
Start: 2025-04-10 | End: 2025-04-10

## 2025-04-09 RX ORDER — APREPITANT 40 MG/1
40 CAPSULE ORAL ONCE
Refills: 0 | Status: COMPLETED | OUTPATIENT
Start: 2025-04-10 | End: 2025-04-10

## 2025-04-09 RX ORDER — POVIDONE-IODINE 7.5 %
1 SOLUTION, NON-ORAL TOPICAL ONCE
Refills: 0 | Status: DISCONTINUED | OUTPATIENT
Start: 2025-04-10 | End: 2025-04-10

## 2025-04-09 RX ORDER — AMLODIPINE BESYLATE AND ATORVASTATIN CALCIUM 10; 10 MG/1; MG/1
1 TABLET, FILM COATED ORAL
Refills: 0 | DISCHARGE

## 2025-04-10 ENCOUNTER — APPOINTMENT (OUTPATIENT)
Dept: SPINE | Facility: HOSPITAL | Age: 75
End: 2025-04-10

## 2025-04-10 ENCOUNTER — INPATIENT (INPATIENT)
Facility: HOSPITAL | Age: 75
LOS: 1 days | Discharge: HOME | End: 2025-04-12
Attending: NEUROLOGICAL SURGERY | Admitting: NEUROLOGICAL SURGERY
Payer: MEDICARE

## 2025-04-10 DIAGNOSIS — Z96.641 PRESENCE OF RIGHT ARTIFICIAL HIP JOINT: Chronic | ICD-10-CM

## 2025-04-10 DIAGNOSIS — Z96.659 PRESENCE OF UNSPECIFIED ARTIFICIAL KNEE JOINT: Chronic | ICD-10-CM

## 2025-04-10 LAB
ANION GAP SERPL CALC-SCNC: 8 MMOL/L — SIGNIFICANT CHANGE UP (ref 5–17)
BASOPHILS # BLD AUTO: 0 K/UL — SIGNIFICANT CHANGE UP (ref 0–0.2)
BASOPHILS NFR BLD AUTO: 0 % — SIGNIFICANT CHANGE UP (ref 0–2)
BLD GP AB SCN SERPL QL: NEGATIVE — SIGNIFICANT CHANGE UP
BUN SERPL-MCNC: 14 MG/DL — SIGNIFICANT CHANGE UP (ref 7–23)
CALCIUM SERPL-MCNC: 8.1 MG/DL — LOW (ref 8.4–10.5)
CHLORIDE SERPL-SCNC: 108 MMOL/L — SIGNIFICANT CHANGE UP (ref 96–108)
CO2 SERPL-SCNC: 25 MMOL/L — SIGNIFICANT CHANGE UP (ref 22–31)
CREAT SERPL-MCNC: 0.55 MG/DL — SIGNIFICANT CHANGE UP (ref 0.5–1.3)
EGFR: 96 ML/MIN/1.73M2 — SIGNIFICANT CHANGE UP
EGFR: 96 ML/MIN/1.73M2 — SIGNIFICANT CHANGE UP
EOSINOPHIL # BLD AUTO: 0 K/UL — SIGNIFICANT CHANGE UP (ref 0–0.5)
EOSINOPHIL NFR BLD AUTO: 0 % — SIGNIFICANT CHANGE UP (ref 0–6)
GLUCOSE SERPL-MCNC: 107 MG/DL — HIGH (ref 70–99)
HCT VFR BLD CALC: 35.7 % — SIGNIFICANT CHANGE UP (ref 34.5–45)
HGB BLD-MCNC: 12.1 G/DL — SIGNIFICANT CHANGE UP (ref 11.5–15.5)
LYMPHOCYTES # BLD AUTO: 0.35 K/UL — LOW (ref 1–3.3)
LYMPHOCYTES # BLD AUTO: 7 % — LOW (ref 13–44)
MCHC RBC-ENTMCNC: 32.4 PG — SIGNIFICANT CHANGE UP (ref 27–34)
MCHC RBC-ENTMCNC: 33.9 G/DL — SIGNIFICANT CHANGE UP (ref 32–36)
MCV RBC AUTO: 95.5 FL — SIGNIFICANT CHANGE UP (ref 80–100)
MONOCYTES # BLD AUTO: 0.05 K/UL — SIGNIFICANT CHANGE UP (ref 0–0.9)
MONOCYTES NFR BLD AUTO: 0.9 % — LOW (ref 2–14)
NEUTROPHILS # BLD AUTO: 4.66 K/UL — SIGNIFICANT CHANGE UP (ref 1.8–7.4)
NEUTROPHILS NFR BLD AUTO: 91.2 % — HIGH (ref 43–77)
PLATELET # BLD AUTO: 198 K/UL — SIGNIFICANT CHANGE UP (ref 150–400)
POTASSIUM SERPL-MCNC: 4 MMOL/L — SIGNIFICANT CHANGE UP (ref 3.5–5.3)
POTASSIUM SERPL-SCNC: 4 MMOL/L — SIGNIFICANT CHANGE UP (ref 3.5–5.3)
RBC # BLD: 3.74 M/UL — LOW (ref 3.8–5.2)
RBC # FLD: 12.9 % — SIGNIFICANT CHANGE UP (ref 10.3–14.5)
RH IG SCN BLD-IMP: POSITIVE — SIGNIFICANT CHANGE UP
SODIUM SERPL-SCNC: 141 MMOL/L — SIGNIFICANT CHANGE UP (ref 135–145)
WBC # BLD: 5.06 K/UL — SIGNIFICANT CHANGE UP (ref 3.8–10.5)
WBC # FLD AUTO: 5.06 K/UL — SIGNIFICANT CHANGE UP (ref 3.8–10.5)

## 2025-04-10 PROCEDURE — 93010 ELECTROCARDIOGRAM REPORT: CPT

## 2025-04-10 PROCEDURE — 22854 INSJ BIOMECHANICAL DEVICE: CPT

## 2025-04-10 PROCEDURE — 22845 INSERT SPINE FIXATION DEVICE: CPT | Mod: 59

## 2025-04-10 PROCEDURE — 22554 ARTHRD ANT NTRBD MIN DSC CRV: CPT

## 2025-04-10 PROCEDURE — 22585 ARTHRD ANT NTRBD MIN DSC EA: CPT

## 2025-04-10 PROCEDURE — 63081 REMOVE VERT BODY DCMPRN CRVL: CPT

## 2025-04-10 DEVICE — SURGIFLO HEMOSTATIC MATRIX KIT: Type: IMPLANTABLE DEVICE | Status: FUNCTIONAL

## 2025-04-10 DEVICE — GRAFT I-FACTOR PUTTY 5CC: Type: IMPLANTABLE DEVICE | Status: FUNCTIONAL

## 2025-04-10 DEVICE — AGENT HEMOSTAT COLLAGEN AVITENE ULTRA 8X12.5CM: Type: IMPLANTABLE DEVICE | Status: FUNCTIONAL

## 2025-04-10 DEVICE — IMPLANTABLE DEVICE: Type: IMPLANTABLE DEVICE | Status: FUNCTIONAL

## 2025-04-10 RX ORDER — METHOCARBAMOL 500 MG/1
500 TABLET, FILM COATED ORAL EVERY 8 HOURS
Refills: 0 | Status: DISCONTINUED | OUTPATIENT
Start: 2025-04-10 | End: 2025-04-12

## 2025-04-10 RX ORDER — POLYETHYLENE GLYCOL 3350 17 G/17G
17 POWDER, FOR SOLUTION ORAL DAILY
Refills: 0 | Status: DISCONTINUED | OUTPATIENT
Start: 2025-04-10 | End: 2025-04-12

## 2025-04-10 RX ORDER — CEFAZOLIN SODIUM IN 0.9 % NACL 3 G/100 ML
2000 INTRAVENOUS SOLUTION, PIGGYBACK (ML) INTRAVENOUS EVERY 8 HOURS
Refills: 0 | Status: COMPLETED | OUTPATIENT
Start: 2025-04-10 | End: 2025-04-11

## 2025-04-10 RX ORDER — ATORVASTATIN CALCIUM 80 MG/1
10 TABLET, FILM COATED ORAL AT BEDTIME
Refills: 0 | Status: DISCONTINUED | OUTPATIENT
Start: 2025-04-10 | End: 2025-04-12

## 2025-04-10 RX ORDER — ACETAMINOPHEN 500 MG/5ML
1000 LIQUID (ML) ORAL EVERY 8 HOURS
Refills: 0 | Status: DISCONTINUED | OUTPATIENT
Start: 2025-04-10 | End: 2025-04-12

## 2025-04-10 RX ORDER — HYDROMORPHONE/SOD CHLOR,ISO/PF 2 MG/10 ML
0.5 SYRINGE (ML) INJECTION EVERY 4 HOURS
Refills: 0 | Status: DISCONTINUED | OUTPATIENT
Start: 2025-04-10 | End: 2025-04-12

## 2025-04-10 RX ORDER — OXYCODONE HYDROCHLORIDE 30 MG/1
5 TABLET ORAL EVERY 4 HOURS
Refills: 0 | Status: DISCONTINUED | OUTPATIENT
Start: 2025-04-10 | End: 2025-04-12

## 2025-04-10 RX ORDER — HYDROMORPHONE/SOD CHLOR,ISO/PF 2 MG/10 ML
0.5 SYRINGE (ML) INJECTION
Refills: 0 | Status: DISCONTINUED | OUTPATIENT
Start: 2025-04-10 | End: 2025-04-10

## 2025-04-10 RX ORDER — ASPIRIN 325 MG
0 TABLET ORAL
Refills: 0 | DISCHARGE

## 2025-04-10 RX ORDER — B1/B2/B3/B5/B6/B12/VIT C/FOLIC 500-0.5 MG
1 TABLET ORAL DAILY
Refills: 0 | Status: DISCONTINUED | OUTPATIENT
Start: 2025-04-10 | End: 2025-04-12

## 2025-04-10 RX ORDER — SENNA 187 MG
2 TABLET ORAL AT BEDTIME
Refills: 0 | Status: DISCONTINUED | OUTPATIENT
Start: 2025-04-10 | End: 2025-04-12

## 2025-04-10 RX ORDER — GABAPENTIN 400 MG/1
100 CAPSULE ORAL AT BEDTIME
Refills: 0 | Status: DISCONTINUED | OUTPATIENT
Start: 2025-04-10 | End: 2025-04-12

## 2025-04-10 RX ORDER — OXYCODONE HYDROCHLORIDE 30 MG/1
10 TABLET ORAL EVERY 4 HOURS
Refills: 0 | Status: DISCONTINUED | OUTPATIENT
Start: 2025-04-10 | End: 2025-04-12

## 2025-04-10 RX ORDER — ASPIRIN 325 MG
81 TABLET ORAL DAILY
Refills: 0 | Status: DISCONTINUED | OUTPATIENT
Start: 2025-04-11 | End: 2025-04-12

## 2025-04-10 RX ORDER — AMLODIPINE BESYLATE 10 MG/1
2.5 TABLET ORAL DAILY
Refills: 0 | Status: DISCONTINUED | OUTPATIENT
Start: 2025-04-11 | End: 2025-04-12

## 2025-04-10 RX ORDER — ONDANSETRON HCL/PF 4 MG/2 ML
4 VIAL (ML) INJECTION EVERY 6 HOURS
Refills: 0 | Status: DISCONTINUED | OUTPATIENT
Start: 2025-04-10 | End: 2025-04-12

## 2025-04-10 RX ADMIN — APREPITANT 40 MILLIGRAM(S): 40 CAPSULE ORAL at 11:26

## 2025-04-10 RX ADMIN — Medication 0.5 MILLIGRAM(S): at 18:04

## 2025-04-10 RX ADMIN — Medication 75 MILLILITER(S): at 21:16

## 2025-04-10 RX ADMIN — Medication 500 MILLILITER(S): at 21:30

## 2025-04-10 RX ADMIN — Medication 1 LOZENGE: at 20:14

## 2025-04-10 RX ADMIN — Medication 1000 MILLIGRAM(S): at 21:23

## 2025-04-10 RX ADMIN — Medication 100 MILLIGRAM(S): at 21:27

## 2025-04-10 RX ADMIN — ATORVASTATIN CALCIUM 10 MILLIGRAM(S): 80 TABLET, FILM COATED ORAL at 21:23

## 2025-04-10 RX ADMIN — Medication 1000 MILLIGRAM(S): at 11:26

## 2025-04-10 RX ADMIN — Medication 2 TABLET(S): at 21:22

## 2025-04-10 RX ADMIN — GABAPENTIN 100 MILLIGRAM(S): 400 CAPSULE ORAL at 21:23

## 2025-04-10 RX ADMIN — Medication 0.5 MILLIGRAM(S): at 17:49

## 2025-04-11 ENCOUNTER — TRANSCRIPTION ENCOUNTER (OUTPATIENT)
Age: 75
End: 2025-04-11

## 2025-04-11 PROCEDURE — 99223 1ST HOSP IP/OBS HIGH 75: CPT

## 2025-04-11 PROCEDURE — 99024 POSTOP FOLLOW-UP VISIT: CPT

## 2025-04-11 RX ORDER — CEFAZOLIN SODIUM IN 0.9 % NACL 3 G/100 ML
2000 INTRAVENOUS SOLUTION, PIGGYBACK (ML) INTRAVENOUS ONCE
Refills: 0 | Status: DISCONTINUED | OUTPATIENT
Start: 2025-04-11 | End: 2025-04-11

## 2025-04-11 RX ORDER — ENOXAPARIN SODIUM 100 MG/ML
40 INJECTION SUBCUTANEOUS
Refills: 0 | Status: DISCONTINUED | OUTPATIENT
Start: 2025-04-11 | End: 2025-04-12

## 2025-04-11 RX ORDER — CEFAZOLIN SODIUM IN 0.9 % NACL 3 G/100 ML
2000 INTRAVENOUS SOLUTION, PIGGYBACK (ML) INTRAVENOUS ONCE
Refills: 0 | Status: COMPLETED | OUTPATIENT
Start: 2025-04-11 | End: 2025-04-11

## 2025-04-11 RX ADMIN — Medication 4 MILLIGRAM(S): at 07:15

## 2025-04-11 RX ADMIN — GABAPENTIN 100 MILLIGRAM(S): 400 CAPSULE ORAL at 22:08

## 2025-04-11 RX ADMIN — ATORVASTATIN CALCIUM 10 MILLIGRAM(S): 80 TABLET, FILM COATED ORAL at 22:08

## 2025-04-11 RX ADMIN — POLYETHYLENE GLYCOL 3350 17 GRAM(S): 17 POWDER, FOR SOLUTION ORAL at 12:56

## 2025-04-11 RX ADMIN — OXYCODONE HYDROCHLORIDE 5 MILLIGRAM(S): 30 TABLET ORAL at 02:48

## 2025-04-11 RX ADMIN — AMLODIPINE BESYLATE 2.5 MILLIGRAM(S): 10 TABLET ORAL at 07:14

## 2025-04-11 RX ADMIN — ENOXAPARIN SODIUM 40 MILLIGRAM(S): 100 INJECTION SUBCUTANEOUS at 22:08

## 2025-04-11 RX ADMIN — Medication 4 MILLIGRAM(S): at 17:04

## 2025-04-11 RX ADMIN — Medication 4 MILLIGRAM(S): at 11:58

## 2025-04-11 RX ADMIN — Medication 1000 MILLIGRAM(S): at 07:14

## 2025-04-11 RX ADMIN — Medication 1000 MILLIGRAM(S): at 22:08

## 2025-04-11 RX ADMIN — Medication 2 TABLET(S): at 22:09

## 2025-04-11 RX ADMIN — Medication 81 MILLIGRAM(S): at 11:58

## 2025-04-11 RX ADMIN — Medication 1 TABLET(S): at 11:58

## 2025-04-11 RX ADMIN — OXYCODONE HYDROCHLORIDE 5 MILLIGRAM(S): 30 TABLET ORAL at 03:36

## 2025-04-11 RX ADMIN — Medication 1000 MILLIGRAM(S): at 13:46

## 2025-04-11 RX ADMIN — Medication 1000 MILLIGRAM(S): at 08:28

## 2025-04-11 RX ADMIN — Medication 100 MILLIGRAM(S): at 13:30

## 2025-04-12 ENCOUNTER — TRANSCRIPTION ENCOUNTER (OUTPATIENT)
Age: 75
End: 2025-04-12

## 2025-04-12 VITALS
SYSTOLIC BLOOD PRESSURE: 114 MMHG | RESPIRATION RATE: 17 BRPM | OXYGEN SATURATION: 98 % | DIASTOLIC BLOOD PRESSURE: 70 MMHG | HEART RATE: 74 BPM | TEMPERATURE: 98 F

## 2025-04-12 LAB
ANION GAP SERPL CALC-SCNC: 7 MMOL/L — SIGNIFICANT CHANGE UP (ref 5–17)
BUN SERPL-MCNC: 15 MG/DL — SIGNIFICANT CHANGE UP (ref 7–23)
CALCIUM SERPL-MCNC: 8.4 MG/DL — SIGNIFICANT CHANGE UP (ref 8.4–10.5)
CHLORIDE SERPL-SCNC: 110 MMOL/L — HIGH (ref 96–108)
CO2 SERPL-SCNC: 27 MMOL/L — SIGNIFICANT CHANGE UP (ref 22–31)
CREAT SERPL-MCNC: 0.63 MG/DL — SIGNIFICANT CHANGE UP (ref 0.5–1.3)
EGFR: 93 ML/MIN/1.73M2 — SIGNIFICANT CHANGE UP
EGFR: 93 ML/MIN/1.73M2 — SIGNIFICANT CHANGE UP
GLUCOSE SERPL-MCNC: 87 MG/DL — SIGNIFICANT CHANGE UP (ref 70–99)
HCT VFR BLD CALC: 38.2 % — SIGNIFICANT CHANGE UP (ref 34.5–45)
HGB BLD-MCNC: 12.4 G/DL — SIGNIFICANT CHANGE UP (ref 11.5–15.5)
MAGNESIUM SERPL-MCNC: 2.4 MG/DL — SIGNIFICANT CHANGE UP (ref 1.6–2.6)
MCHC RBC-ENTMCNC: 32.5 G/DL — SIGNIFICANT CHANGE UP (ref 32–36)
MCHC RBC-ENTMCNC: 32.5 PG — SIGNIFICANT CHANGE UP (ref 27–34)
MCV RBC AUTO: 100.3 FL — HIGH (ref 80–100)
NRBC BLD AUTO-RTO: 0 /100 WBCS — SIGNIFICANT CHANGE UP (ref 0–0)
PHOSPHATE SERPL-MCNC: 2.1 MG/DL — LOW (ref 2.5–4.5)
PLATELET # BLD AUTO: 192 K/UL — SIGNIFICANT CHANGE UP (ref 150–400)
POTASSIUM SERPL-MCNC: 3.7 MMOL/L — SIGNIFICANT CHANGE UP (ref 3.5–5.3)
POTASSIUM SERPL-SCNC: 3.7 MMOL/L — SIGNIFICANT CHANGE UP (ref 3.5–5.3)
RBC # BLD: 3.81 M/UL — SIGNIFICANT CHANGE UP (ref 3.8–5.2)
RBC # FLD: 13.2 % — SIGNIFICANT CHANGE UP (ref 10.3–14.5)
SODIUM SERPL-SCNC: 144 MMOL/L — SIGNIFICANT CHANGE UP (ref 135–145)
WBC # BLD: 10.15 K/UL — SIGNIFICANT CHANGE UP (ref 3.8–10.5)
WBC # FLD AUTO: 10.15 K/UL — SIGNIFICANT CHANGE UP (ref 3.8–10.5)

## 2025-04-12 PROCEDURE — C1713: CPT

## 2025-04-12 PROCEDURE — 83735 ASSAY OF MAGNESIUM: CPT

## 2025-04-12 PROCEDURE — 72040 X-RAY EXAM NECK SPINE 2-3 VW: CPT | Mod: 26

## 2025-04-12 PROCEDURE — 72040 X-RAY EXAM NECK SPINE 2-3 VW: CPT

## 2025-04-12 PROCEDURE — 93005 ELECTROCARDIOGRAM TRACING: CPT

## 2025-04-12 PROCEDURE — 99024 POSTOP FOLLOW-UP VISIT: CPT

## 2025-04-12 PROCEDURE — 86900 BLOOD TYPING SEROLOGIC ABO: CPT

## 2025-04-12 PROCEDURE — 86850 RBC ANTIBODY SCREEN: CPT

## 2025-04-12 PROCEDURE — 84100 ASSAY OF PHOSPHORUS: CPT

## 2025-04-12 PROCEDURE — 86901 BLOOD TYPING SEROLOGIC RH(D): CPT

## 2025-04-12 PROCEDURE — 97165 OT EVAL LOW COMPLEX 30 MIN: CPT

## 2025-04-12 PROCEDURE — 99233 SBSQ HOSP IP/OBS HIGH 50: CPT

## 2025-04-12 PROCEDURE — C1889: CPT

## 2025-04-12 PROCEDURE — 85027 COMPLETE CBC AUTOMATED: CPT

## 2025-04-12 PROCEDURE — 85025 COMPLETE CBC W/AUTO DIFF WBC: CPT

## 2025-04-12 PROCEDURE — 80048 BASIC METABOLIC PNL TOTAL CA: CPT

## 2025-04-12 PROCEDURE — 76000 FLUOROSCOPY <1 HR PHYS/QHP: CPT

## 2025-04-12 PROCEDURE — 36415 COLL VENOUS BLD VENIPUNCTURE: CPT

## 2025-04-12 RX ORDER — SOD PHOS DI, MONO/K PHOS MONO 250 MG
1 TABLET ORAL EVERY 4 HOURS
Refills: 0 | Status: COMPLETED | OUTPATIENT
Start: 2025-04-12 | End: 2025-04-12

## 2025-04-12 RX ORDER — POLYETHYLENE GLYCOL 3350 17 G/17G
17 POWDER, FOR SOLUTION ORAL
Qty: 0 | Refills: 0 | DISCHARGE
Start: 2025-04-12

## 2025-04-12 RX ORDER — ASPIRIN 325 MG
1 TABLET ORAL
Refills: 0 | DISCHARGE

## 2025-04-12 RX ORDER — GABAPENTIN 400 MG/1
1 CAPSULE ORAL
Qty: 0 | Refills: 0 | DISCHARGE
Start: 2025-04-12

## 2025-04-12 RX ORDER — POTASSIUM PHOSPHATE, MONOBASIC POTASSIUM PHOSPHATE, DIBASIC INJECTION, 236; 224 MG/ML; MG/ML
30 SOLUTION, CONCENTRATE INTRAVENOUS ONCE
Refills: 0 | Status: DISCONTINUED | OUTPATIENT
Start: 2025-04-12 | End: 2025-04-12

## 2025-04-12 RX ORDER — ASPIRIN 325 MG
1 TABLET ORAL
Qty: 0 | Refills: 0 | DISCHARGE
Start: 2025-04-12

## 2025-04-12 RX ORDER — GABAPENTIN 400 MG/1
1 CAPSULE ORAL
Refills: 0 | DISCHARGE

## 2025-04-12 RX ORDER — SOD PHOS DI, MONO/K PHOS MONO 250 MG
1 TABLET ORAL ONCE
Refills: 0 | Status: DISCONTINUED | OUTPATIENT
Start: 2025-04-12 | End: 2025-04-12

## 2025-04-12 RX ORDER — DICLOFENAC SODIUM 75 MG/1
1 TABLET, DELAYED RELEASE ORAL
Refills: 0 | DISCHARGE

## 2025-04-12 RX ADMIN — Medication 40 MILLIEQUIVALENT(S): at 09:47

## 2025-04-12 RX ADMIN — AMLODIPINE BESYLATE 2.5 MILLIGRAM(S): 10 TABLET ORAL at 05:48

## 2025-04-12 RX ADMIN — Medication 1 PACKET(S): at 13:19

## 2025-04-12 RX ADMIN — Medication 1 PACKET(S): at 09:47

## 2025-04-12 RX ADMIN — Medication 1 TABLET(S): at 11:14

## 2025-04-12 RX ADMIN — Medication 1000 MILLIGRAM(S): at 13:19

## 2025-04-12 RX ADMIN — POLYETHYLENE GLYCOL 3350 17 GRAM(S): 17 POWDER, FOR SOLUTION ORAL at 11:14

## 2025-04-12 RX ADMIN — Medication 4 MILLIGRAM(S): at 11:14

## 2025-04-12 RX ADMIN — Medication 81 MILLIGRAM(S): at 11:14

## 2025-04-12 RX ADMIN — Medication 1000 MILLIGRAM(S): at 05:47

## 2025-04-14 PROBLEM — J30.2 OTHER SEASONAL ALLERGIC RHINITIS: Chronic | Status: ACTIVE | Noted: 2025-04-09

## 2025-04-14 PROBLEM — I10 ESSENTIAL (PRIMARY) HYPERTENSION: Chronic | Status: ACTIVE | Noted: 2025-04-09

## 2025-04-14 PROBLEM — Z87.39 PERSONAL HISTORY OF OTHER DISEASES OF THE MUSCULOSKELETAL SYSTEM AND CONNECTIVE TISSUE: Chronic | Status: ACTIVE | Noted: 2025-04-09

## 2025-04-15 DIAGNOSIS — M50.01 CERVICAL DISC DISORDER WITH MYELOPATHY, HIGH CERVICAL REGION: ICD-10-CM

## 2025-04-15 DIAGNOSIS — M50.021 CERVICAL DISC DISORDER AT C4-C5 LEVEL WITH MYELOPATHY: ICD-10-CM

## 2025-04-15 DIAGNOSIS — Z85.89 PERSONAL HISTORY OF MALIGNANT NEOPLASM OF OTHER ORGANS AND SYSTEMS: ICD-10-CM

## 2025-04-15 DIAGNOSIS — E78.5 HYPERLIPIDEMIA, UNSPECIFIED: ICD-10-CM

## 2025-04-15 DIAGNOSIS — Z96.643 PRESENCE OF ARTIFICIAL HIP JOINT, BILATERAL: ICD-10-CM

## 2025-04-15 DIAGNOSIS — Z92.21 PERSONAL HISTORY OF ANTINEOPLASTIC CHEMOTHERAPY: ICD-10-CM

## 2025-04-15 DIAGNOSIS — Z92.3 PERSONAL HISTORY OF IRRADIATION: ICD-10-CM

## 2025-04-15 DIAGNOSIS — Z79.82 LONG TERM (CURRENT) USE OF ASPIRIN: ICD-10-CM

## 2025-04-15 DIAGNOSIS — Z96.652 PRESENCE OF LEFT ARTIFICIAL KNEE JOINT: ICD-10-CM

## 2025-04-15 DIAGNOSIS — I10 ESSENTIAL (PRIMARY) HYPERTENSION: ICD-10-CM

## 2025-04-15 DIAGNOSIS — M48.02 SPINAL STENOSIS, CERVICAL REGION: ICD-10-CM

## 2025-04-15 DIAGNOSIS — M19.90 UNSPECIFIED OSTEOARTHRITIS, UNSPECIFIED SITE: ICD-10-CM

## 2025-04-24 ENCOUNTER — APPOINTMENT (OUTPATIENT)
Dept: SPINE | Facility: CLINIC | Age: 75
End: 2025-04-24
Payer: MEDICARE

## 2025-04-24 VITALS
DIASTOLIC BLOOD PRESSURE: 74 MMHG | OXYGEN SATURATION: 95 % | BODY MASS INDEX: 27.05 KG/M2 | HEART RATE: 91 BPM | WEIGHT: 147 LBS | SYSTOLIC BLOOD PRESSURE: 131 MMHG | TEMPERATURE: 98 F | RESPIRATION RATE: 18 BRPM | HEIGHT: 62 IN

## 2025-04-24 DIAGNOSIS — Z98.1 ARTHRODESIS STATUS: ICD-10-CM

## 2025-04-24 PROCEDURE — 99024 POSTOP FOLLOW-UP VISIT: CPT

## 2025-05-29 ENCOUNTER — APPOINTMENT (OUTPATIENT)
Dept: SPINE | Facility: CLINIC | Age: 75
End: 2025-05-29

## 2025-05-29 ENCOUNTER — OUTPATIENT (OUTPATIENT)
Dept: OUTPATIENT SERVICES | Facility: HOSPITAL | Age: 75
LOS: 1 days | End: 2025-05-29
Payer: MEDICARE

## 2025-05-29 ENCOUNTER — RESULT REVIEW (OUTPATIENT)
Age: 75
End: 2025-05-29

## 2025-05-29 VITALS
RESPIRATION RATE: 18 BRPM | SYSTOLIC BLOOD PRESSURE: 108 MMHG | HEART RATE: 92 BPM | DIASTOLIC BLOOD PRESSURE: 63 MMHG | BODY MASS INDEX: 27.05 KG/M2 | OXYGEN SATURATION: 93 % | WEIGHT: 147 LBS | HEIGHT: 62 IN

## 2025-05-29 DIAGNOSIS — Z98.1 ARTHRODESIS STATUS: ICD-10-CM

## 2025-05-29 DIAGNOSIS — Z96.641 PRESENCE OF RIGHT ARTIFICIAL HIP JOINT: Chronic | ICD-10-CM

## 2025-05-29 DIAGNOSIS — Z96.659 PRESENCE OF UNSPECIFIED ARTIFICIAL KNEE JOINT: Chronic | ICD-10-CM

## 2025-05-29 PROCEDURE — 99024 POSTOP FOLLOW-UP VISIT: CPT

## 2025-05-29 PROCEDURE — 72040 X-RAY EXAM NECK SPINE 2-3 VW: CPT | Mod: 26

## 2025-05-29 PROCEDURE — 72040 X-RAY EXAM NECK SPINE 2-3 VW: CPT

## 2025-07-09 ENCOUNTER — NON-APPOINTMENT (OUTPATIENT)
Age: 75
End: 2025-07-09

## 2025-07-09 ENCOUNTER — APPOINTMENT (OUTPATIENT)
Dept: SPINE | Facility: CLINIC | Age: 75
End: 2025-07-09
Payer: MEDICARE

## 2025-07-09 VITALS
SYSTOLIC BLOOD PRESSURE: 108 MMHG | OXYGEN SATURATION: 94 % | HEART RATE: 84 BPM | DIASTOLIC BLOOD PRESSURE: 64 MMHG | WEIGHT: 165 LBS | RESPIRATION RATE: 18 BRPM | TEMPERATURE: 97.5 F | BODY MASS INDEX: 30.36 KG/M2 | HEIGHT: 62 IN

## 2025-07-09 PROCEDURE — 99024 POSTOP FOLLOW-UP VISIT: CPT

## (undated) DEVICE — DRAPE MICROSCOPE EXOSCOPE 12" X 79"

## (undated) DEVICE — Device

## (undated) DEVICE — BUR STRYKER CARBIDE MATCH HEAD 3MM

## (undated) DEVICE — BUR STRYKER MULTI FLUTE ROUND 2MM

## (undated) DEVICE — SUT MONOCRYL 3-0 27" PS-2 UNDYED

## (undated) DEVICE — WARMING BLANKET LOWER ADULT

## (undated) DEVICE — MIDAS REX MR8 MATCH HEAD FLUTED SM BORE 3MM X 10CM

## (undated) DEVICE — POSITIONER FOAM EGG CRATE ULNAR 2PCS (PINK)

## (undated) DEVICE — VENODYNE/SCD SLEEVE CALF MEDIUM

## (undated) DEVICE — NEURO SURGICAL STRIP 1/2 X 6"

## (undated) DEVICE — CLIPPER BLADE GENERAL USE

## (undated) DEVICE — NEPTUNE 4-PORT MANIFOLD STANDARD

## (undated) DEVICE — DRAPE FOR 2 TIER TABLE W/ 8" BACK 72" LONG

## (undated) DEVICE — SUT MONOCRYL 4-0 27" PS-2 UNDYED

## (undated) DEVICE — MIDAS REX MR8 BALL FLUTED SM BORE 2MM X 10CM

## (undated) DEVICE — PREP CHLORAPREP HI-LITE ORANGE 26ML

## (undated) DEVICE — DRAPE TOWEL BLUE 17" X 24"

## (undated) DEVICE — DRAPE MICROSCOPE LEICA 26" X 150"

## (undated) DEVICE — SUT VICRYL 3-0 27" SH

## (undated) DEVICE — DRAPE THYROID 77" X 123"

## (undated) DEVICE — MARKING PEN W RULER

## (undated) DEVICE — TAPE SILK 3"

## (undated) DEVICE — PACK SPINE

## (undated) DEVICE — BIPOLAR FORCEP SYMMETRY BAYONET STR 8.25" X 1.5MM (BLUE)

## (undated) DEVICE — ELCTR STRYKER NEPTUNE SMOKE EVACUATION PENCIL (GREEN)